# Patient Record
Sex: MALE | Race: WHITE | Employment: UNEMPLOYED | URBAN - METROPOLITAN AREA
[De-identification: names, ages, dates, MRNs, and addresses within clinical notes are randomized per-mention and may not be internally consistent; named-entity substitution may affect disease eponyms.]

---

## 2018-10-31 ENCOUNTER — OFFICE VISIT (OUTPATIENT)
Dept: PRIMARY CARE CLINIC | Age: 23
End: 2018-10-31
Payer: COMMERCIAL

## 2018-10-31 DIAGNOSIS — M54.6 ACUTE MIDLINE THORACIC BACK PAIN: ICD-10-CM

## 2018-10-31 DIAGNOSIS — M54.5 ACUTE BILATERAL LOW BACK PAIN, WITH SCIATICA PRESENCE UNSPECIFIED: ICD-10-CM

## 2018-10-31 DIAGNOSIS — Z13.31 POSITIVE DEPRESSION SCREENING: ICD-10-CM

## 2018-10-31 DIAGNOSIS — F32.1 CURRENT MODERATE EPISODE OF MAJOR DEPRESSIVE DISORDER WITHOUT PRIOR EPISODE (HCC): Primary | ICD-10-CM

## 2018-10-31 PROCEDURE — G8431 POS CLIN DEPRES SCRN F/U DOC: HCPCS | Performed by: NURSE PRACTITIONER

## 2018-10-31 PROCEDURE — 99203 OFFICE O/P NEW LOW 30 MIN: CPT | Performed by: NURSE PRACTITIONER

## 2018-10-31 PROCEDURE — G0444 DEPRESSION SCREEN ANNUAL: HCPCS | Performed by: NURSE PRACTITIONER

## 2018-10-31 RX ORDER — CYCLOBENZAPRINE HCL 10 MG
TABLET ORAL
Refills: 5 | COMMUNITY
Start: 2018-10-05 | End: 2019-05-15 | Stop reason: ALTCHOICE

## 2018-10-31 RX ORDER — IBUPROFEN 800 MG/1
TABLET ORAL
Refills: 5 | COMMUNITY
Start: 2018-10-05 | End: 2020-02-28

## 2018-10-31 RX ORDER — SERTRALINE HYDROCHLORIDE 25 MG/1
25 TABLET, FILM COATED ORAL DAILY
Qty: 30 TABLET | Refills: 0 | Status: SHIPPED | OUTPATIENT
Start: 2018-10-31 | End: 2018-11-15 | Stop reason: SINTOL

## 2018-10-31 ASSESSMENT — PATIENT HEALTH QUESTIONNAIRE - PHQ9
6. FEELING BAD ABOUT YOURSELF - OR THAT YOU ARE A FAILURE OR HAVE LET YOURSELF OR YOUR FAMILY DOWN: 1
7. TROUBLE CONCENTRATING ON THINGS, SUCH AS READING THE NEWSPAPER OR WATCHING TELEVISION: 0
SUM OF ALL RESPONSES TO PHQ9 QUESTIONS 1 & 2: 3
SUM OF ALL RESPONSES TO PHQ QUESTIONS 1-9: 10
2. FEELING DOWN, DEPRESSED OR HOPELESS: 1
10. IF YOU CHECKED OFF ANY PROBLEMS, HOW DIFFICULT HAVE THESE PROBLEMS MADE IT FOR YOU TO DO YOUR WORK, TAKE CARE OF THINGS AT HOME, OR GET ALONG WITH OTHER PEOPLE: 1
8. MOVING OR SPEAKING SO SLOWLY THAT OTHER PEOPLE COULD HAVE NOTICED. OR THE OPPOSITE, BEING SO FIGETY OR RESTLESS THAT YOU HAVE BEEN MOVING AROUND A LOT MORE THAN USUAL: 0
5. POOR APPETITE OR OVEREATING: 2
4. FEELING TIRED OR HAVING LITTLE ENERGY: 2
9. THOUGHTS THAT YOU WOULD BE BETTER OFF DEAD, OR OF HURTING YOURSELF: 0
1. LITTLE INTEREST OR PLEASURE IN DOING THINGS: 2
SUM OF ALL RESPONSES TO PHQ QUESTIONS 1-9: 10
3. TROUBLE FALLING OR STAYING ASLEEP: 2

## 2018-10-31 NOTE — PROGRESS NOTES
disorder 12/16/2011    TMJ disorder      Past Surgical History:   Procedure Laterality Date    TONSILLECTOMY AND ADENOIDECTOMY  80    TYMPANOPLASTY  89     Family History   Problem Relation Age of Onset    Depression Mother     Anxiety Disorder Maternal Grandmother      Social History     Social History    Marital status: Single     Spouse name: N/A    Number of children: N/A    Years of education: N/A     Occupational History    student      Social History Main Topics    Smoking status: Never Smoker    Smokeless tobacco: Never Used    Alcohol use No    Drug use: No    Sexual activity: Not Currently     Partners: Female     Other Topics Concern    Not on file     Social History Narrative    No narrative on file       Review of Systems   Constitutional: Positive for activity change and fatigue. Negative for appetite change, chills and fever. HENT: Negative. Eyes: Negative. Respiratory: Negative for cough, chest tightness, shortness of breath, wheezing and stridor. Cardiovascular: Negative for chest pain and palpitations. Gastrointestinal: Negative for abdominal pain, constipation, diarrhea, nausea and vomiting. Genitourinary: Negative for pelvic pain. Musculoskeletal: Positive for back pain. Negative for arthralgias, gait problem, joint swelling, myalgias, neck pain and neck stiffness. Skin: Negative for rash. Allergic/Immunologic: Negative for environmental allergies and food allergies. Neurological: Positive for numbness. Negative for syncope, weakness, light-headedness, headaches and paresthesias. Hematological: Negative for adenopathy. Does not bruise/bleed easily. Psychiatric/Behavioral: Positive for decreased concentration, dysphoric mood and sleep disturbance. Negative for agitation, confusion, self-injury and suicidal ideas. The patient is nervous/anxious. The patient is not hyperactive.         Vitals:  Vitals:    11/08/18 0908   BP: 122/74   Pulse: 106   Resp: 16

## 2018-11-08 VITALS
DIASTOLIC BLOOD PRESSURE: 74 MMHG | HEART RATE: 106 BPM | RESPIRATION RATE: 16 BRPM | OXYGEN SATURATION: 98 % | WEIGHT: 161 LBS | HEIGHT: 67 IN | SYSTOLIC BLOOD PRESSURE: 122 MMHG | TEMPERATURE: 97.8 F | BODY MASS INDEX: 25.27 KG/M2

## 2018-11-08 ASSESSMENT — ENCOUNTER SYMPTOMS
CONSTIPATION: 0
ABDOMINAL PAIN: 0
BACK PAIN: 1
DIARRHEA: 0
NAUSEA: 0
WHEEZING: 0
VOMITING: 0
CHEST TIGHTNESS: 0
EYES NEGATIVE: 1
COUGH: 0
STRIDOR: 0
SHORTNESS OF BREATH: 0

## 2018-11-12 ENCOUNTER — OFFICE VISIT (OUTPATIENT)
Dept: ORTHOPEDIC SURGERY | Age: 23
End: 2018-11-12
Payer: COMMERCIAL

## 2018-11-12 VITALS — WEIGHT: 161 LBS | BODY MASS INDEX: 25.27 KG/M2 | HEIGHT: 67 IN

## 2018-11-12 DIAGNOSIS — M51.36 DDD (DEGENERATIVE DISC DISEASE), LUMBAR: ICD-10-CM

## 2018-11-12 DIAGNOSIS — S39.012A LUMBAR STRAIN, INITIAL ENCOUNTER: ICD-10-CM

## 2018-11-12 DIAGNOSIS — M53.3 SI (SACROILIAC) JOINT DYSFUNCTION: Primary | ICD-10-CM

## 2018-11-12 DIAGNOSIS — M54.59 MECHANICAL LOW BACK PAIN: ICD-10-CM

## 2018-11-12 DIAGNOSIS — S29.019A STRAIN OF THORACIC REGION, INITIAL ENCOUNTER: ICD-10-CM

## 2018-11-12 PROCEDURE — 99243 OFF/OP CNSLTJ NEW/EST LOW 30: CPT | Performed by: INTERNAL MEDICINE

## 2018-11-12 NOTE — LETTER
[facility]  [address] [address]  Phone: [phone]  Fax: [phone]    Jairo Julio MD        November 12, 2018     Patient: Joseph Plascencia   YOB: 1995   Date of Visit: 11/12/2018       To Whom It May Concern: It is my medical opinion that Ade Carpio Can return to work with the following accommodations:     No running or jumping activity  Lifting limited to 50 pounds nonrepetitive with safe lifting techniques  Minimize repetitive bending or stooping  Alternate sitting and standing as needed for symptom control    These accommodations are in effect for the next 1 month. If you have any questions or concerns, please don't hesitate to call.     Sincerely,      Emma Beasley MD.    Jairo Julio MD

## 2018-11-12 NOTE — PROGRESS NOTES
Allergies: Allergies   Allergen Reactions    Penicillins         Social History:         Social History     Social History    Marital status: Single     Spouse name: N/A    Number of children: N/A    Years of education: N/A     Occupational History    student      Social History Main Topics    Smoking status: Never Smoker    Smokeless tobacco: Never Used    Alcohol use No    Drug use: No    Sexual activity: Not Currently     Partners: Female     Other Topics Concern    Not on file     Social History Narrative    No narrative on file        Review of Symptoms:    Pertinent items are noted in HPI    Review of systems reviewed from Patient History Form dated on Today's date and   available in the patient's chart under the Media tab. Vital Signs: There were no vitals filed for this visit. General Exam:     Constitutional: Patient is adequately groomed with no evidence of malnutrition  Mental Status: The patient is oriented to time, place and person. The patient's mood and affect are appropriate. Vascular: Examination reveals no swelling or calf tenderness. Peripheral pulses are palpable and 2+. Lymphatics: no lymphadenopathy of the inguinal region or lower extremity      Physical Exam: lower back/thoracic spine       Primary Exam:    Inspection:  No deformity atrophy or appreciable curvature       Palpation:  There is diffuse tenderness mid to lower lumbar axial and periaxial, no focal trigger point tenderness minimal tenderness axial thoracic spine      Range of Motion:  Thoracolumbar spine: 90/10 pain greater than extension than flexion      Strength:  Normal lower extremity      Special Tests:  Negative SLR, supine sit test positive dynamic lengthening from static shortening position-right lower extremity supine to sit      Skin: There are no rashes, ulcerations or lesions.       Gait: Nonantalgic      Reflex intact lower     Additional Comments:        Additional

## 2018-11-15 ENCOUNTER — OFFICE VISIT (OUTPATIENT)
Dept: PRIMARY CARE CLINIC | Age: 23
End: 2018-11-15
Payer: COMMERCIAL

## 2018-11-15 VITALS
HEART RATE: 113 BPM | DIASTOLIC BLOOD PRESSURE: 82 MMHG | OXYGEN SATURATION: 97 % | BODY MASS INDEX: 24.48 KG/M2 | WEIGHT: 156 LBS | HEIGHT: 67 IN | SYSTOLIC BLOOD PRESSURE: 122 MMHG

## 2018-11-15 DIAGNOSIS — F41.9 ANXIETY: ICD-10-CM

## 2018-11-15 DIAGNOSIS — F32.1 CURRENT MODERATE EPISODE OF MAJOR DEPRESSIVE DISORDER WITHOUT PRIOR EPISODE (HCC): Primary | ICD-10-CM

## 2018-11-15 PROCEDURE — G0444 DEPRESSION SCREEN ANNUAL: HCPCS | Performed by: NURSE PRACTITIONER

## 2018-11-15 PROCEDURE — 99213 OFFICE O/P EST LOW 20 MIN: CPT | Performed by: NURSE PRACTITIONER

## 2018-11-15 RX ORDER — CITALOPRAM 20 MG/1
20 TABLET ORAL DAILY
Qty: 30 TABLET | Refills: 0 | Status: SHIPPED | OUTPATIENT
Start: 2018-11-15 | End: 2019-05-15 | Stop reason: DRUGHIGH

## 2018-11-15 RX ORDER — HYDROXYZINE PAMOATE 50 MG/1
50 CAPSULE ORAL 3 TIMES DAILY PRN
Qty: 90 CAPSULE | Refills: 0 | Status: SHIPPED | OUTPATIENT
Start: 2018-11-15 | End: 2018-11-21 | Stop reason: SDUPTHER

## 2018-11-15 ASSESSMENT — PATIENT HEALTH QUESTIONNAIRE - PHQ9
2. FEELING DOWN, DEPRESSED OR HOPELESS: 2
7. TROUBLE CONCENTRATING ON THINGS, SUCH AS READING THE NEWSPAPER OR WATCHING TELEVISION: 1
9. THOUGHTS THAT YOU WOULD BE BETTER OFF DEAD, OR OF HURTING YOURSELF: 0
8. MOVING OR SPEAKING SO SLOWLY THAT OTHER PEOPLE COULD HAVE NOTICED. OR THE OPPOSITE, BEING SO FIGETY OR RESTLESS THAT YOU HAVE BEEN MOVING AROUND A LOT MORE THAN USUAL: 1
SUM OF ALL RESPONSES TO PHQ QUESTIONS 1-9: 15
10. IF YOU CHECKED OFF ANY PROBLEMS, HOW DIFFICULT HAVE THESE PROBLEMS MADE IT FOR YOU TO DO YOUR WORK, TAKE CARE OF THINGS AT HOME, OR GET ALONG WITH OTHER PEOPLE: 2
SUM OF ALL RESPONSES TO PHQ9 QUESTIONS 1 & 2: 4
1. LITTLE INTEREST OR PLEASURE IN DOING THINGS: 2
3. TROUBLE FALLING OR STAYING ASLEEP: 2
6. FEELING BAD ABOUT YOURSELF - OR THAT YOU ARE A FAILURE OR HAVE LET YOURSELF OR YOUR FAMILY DOWN: 3
4. FEELING TIRED OR HAVING LITTLE ENERGY: 3
5. POOR APPETITE OR OVEREATING: 1
SUM OF ALL RESPONSES TO PHQ QUESTIONS 1-9: 15

## 2018-11-21 ENCOUNTER — TELEPHONE (OUTPATIENT)
Dept: PRIMARY CARE CLINIC | Age: 23
End: 2018-11-21

## 2018-11-21 RX ORDER — HYDROXYZINE PAMOATE 50 MG/1
50 CAPSULE ORAL 3 TIMES DAILY PRN
Qty: 90 CAPSULE | Refills: 0 | Status: SHIPPED | OUTPATIENT
Start: 2018-11-21 | End: 2018-12-21

## 2018-11-21 ASSESSMENT — ENCOUNTER SYMPTOMS: RESPIRATORY NEGATIVE: 1

## 2018-11-21 NOTE — PROGRESS NOTES
Positive for activity change and fatigue. Negative for appetite change, chills, diaphoresis, fever and unexpected weight change. Respiratory: Negative. Cardiovascular: Negative. Neurological: Negative. Psychiatric/Behavioral: Positive for decreased concentration, dysphoric mood and sleep disturbance. Negative for agitation, behavioral problems, confusion, hallucinations, self-injury and suicidal ideas. The patient is nervous/anxious. The patient is not hyperactive. Vitals:  Vitals:    11/15/18 1440   BP: 122/82   Site: Left Upper Arm   Position: Sitting   Cuff Size: Large Adult   Pulse: 113   SpO2: 97%   Weight: 156 lb (70.8 kg)   Height: 5' 7\" (1.702 m)          Physical Exam   Constitutional: He is oriented to person, place, and time. He appears well-developed and well-nourished. No distress. Neck: Normal range of motion. Cardiovascular: Normal rate, regular rhythm, normal heart sounds and intact distal pulses. No murmur heard. Pulmonary/Chest: Effort normal and breath sounds normal. No respiratory distress. He has no wheezes. Neurological: He is alert and oriented to person, place, and time. Coordination normal.   Skin: Skin is warm and dry. Capillary refill takes less than 2 seconds. No rash noted. He is not diaphoretic. Psychiatric: His speech is normal. Judgment and thought content normal. His mood appears anxious. He is withdrawn. Cognition and memory are normal. He exhibits a depressed mood (tearful during visit). He expresses no homicidal and no suicidal ideation. He expresses no suicidal plans and no homicidal plans. He is attentive. Vitals reviewed. Assessment/Plan:   Yisel Romero was seen today for medication adjustment. Diagnoses and all orders for this visit:    Current moderate episode of major depressive disorder without prior episode (HCC)  -     citalopram (CELEXA) 20 MG tablet;  Take 1 tablet by mouth daily    Anxiety  -     hydrOXYzine (VISTARIL) 50 MG capsule;

## 2018-11-28 ENCOUNTER — OFFICE VISIT (OUTPATIENT)
Dept: PRIMARY CARE CLINIC | Age: 23
End: 2018-11-28
Payer: COMMERCIAL

## 2018-11-28 VITALS
OXYGEN SATURATION: 99 % | WEIGHT: 157 LBS | DIASTOLIC BLOOD PRESSURE: 82 MMHG | HEART RATE: 90 BPM | SYSTOLIC BLOOD PRESSURE: 118 MMHG | BODY MASS INDEX: 24.59 KG/M2

## 2018-11-28 DIAGNOSIS — F32.1 CURRENT MODERATE EPISODE OF MAJOR DEPRESSIVE DISORDER WITHOUT PRIOR EPISODE (HCC): Primary | ICD-10-CM

## 2018-11-28 DIAGNOSIS — F41.9 ANXIETY: ICD-10-CM

## 2018-11-28 PROCEDURE — 99213 OFFICE O/P EST LOW 20 MIN: CPT | Performed by: NURSE PRACTITIONER

## 2018-11-28 PROCEDURE — G0444 DEPRESSION SCREEN ANNUAL: HCPCS | Performed by: NURSE PRACTITIONER

## 2018-11-28 RX ORDER — CITALOPRAM 10 MG/1
10 TABLET ORAL DAILY
Qty: 30 TABLET | Refills: 0 | Status: SHIPPED | OUTPATIENT
Start: 2018-11-28 | End: 2019-05-15 | Stop reason: DRUGHIGH

## 2018-11-28 ASSESSMENT — PATIENT HEALTH QUESTIONNAIRE - PHQ9
9. THOUGHTS THAT YOU WOULD BE BETTER OFF DEAD, OR OF HURTING YOURSELF: 0
5. POOR APPETITE OR OVEREATING: 2
7. TROUBLE CONCENTRATING ON THINGS, SUCH AS READING THE NEWSPAPER OR WATCHING TELEVISION: 1
10. IF YOU CHECKED OFF ANY PROBLEMS, HOW DIFFICULT HAVE THESE PROBLEMS MADE IT FOR YOU TO DO YOUR WORK, TAKE CARE OF THINGS AT HOME, OR GET ALONG WITH OTHER PEOPLE: 1
8. MOVING OR SPEAKING SO SLOWLY THAT OTHER PEOPLE COULD HAVE NOTICED. OR THE OPPOSITE, BEING SO FIGETY OR RESTLESS THAT YOU HAVE BEEN MOVING AROUND A LOT MORE THAN USUAL: 0
1. LITTLE INTEREST OR PLEASURE IN DOING THINGS: 3
SUM OF ALL RESPONSES TO PHQ QUESTIONS 1-9: 10
3. TROUBLE FALLING OR STAYING ASLEEP: 1
SUM OF ALL RESPONSES TO PHQ9 QUESTIONS 1 & 2: 4
2. FEELING DOWN, DEPRESSED OR HOPELESS: 1
6. FEELING BAD ABOUT YOURSELF - OR THAT YOU ARE A FAILURE OR HAVE LET YOURSELF OR YOUR FAMILY DOWN: 1
SUM OF ALL RESPONSES TO PHQ QUESTIONS 1-9: 10
4. FEELING TIRED OR HAVING LITTLE ENERGY: 1

## 2018-11-28 ASSESSMENT — ENCOUNTER SYMPTOMS
RESPIRATORY NEGATIVE: 1
GASTROINTESTINAL NEGATIVE: 1

## 2018-11-28 NOTE — PROGRESS NOTES
Large Adult   Pulse: 90   SpO2: 99%   Weight: 157 lb (71.2 kg)          Physical Exam   Constitutional: He is oriented to person, place, and time. He appears well-developed and well-nourished. No distress. Neck: Normal range of motion. No thyromegaly present. Cardiovascular: Normal rate, regular rhythm, normal heart sounds and intact distal pulses. Exam reveals no friction rub. No murmur heard. Pulmonary/Chest: Effort normal and breath sounds normal.   Neurological: He is alert and oriented to person, place, and time. Coordination normal.   Skin: Skin is warm and dry. Capillary refill takes less than 2 seconds. No rash noted. He is not diaphoretic. Psychiatric: His speech is normal and behavior is normal. Judgment and thought content normal. His mood appears anxious (some improvement). Cognition and memory are normal. He exhibits a depressed mood (mild but improved). He is attentive. Vitals reviewed. Assessment/Plan:   Gia Gomez was seen today for medication check. Diagnoses and all orders for this visit:    Current moderate episode of major depressive disorder without prior episode ([facility] [address]  -     citalopram (CELEXA) 10 MG tablet; Take 1 tablet by mouth daily    Anxiety  Continue vistaril for anxiety and improved sleep at night. Patient aware he can take this 3 times daily but wishes to keep just at night for the time being. He also reports he has not scheduled with counseling but plans to do that soon. Return in about 3 weeks (around 12/19/2018). Patient agrees to plan and expresses understanding.     [address], APRN - NP

## 2019-03-05 ENCOUNTER — TELEPHONE (OUTPATIENT)
Dept: ORTHOPEDIC SURGERY | Age: 24
End: 2019-03-05

## 2019-05-15 ENCOUNTER — OFFICE VISIT (OUTPATIENT)
Dept: PRIMARY CARE CLINIC | Age: 24
End: 2019-05-15
Payer: COMMERCIAL

## 2019-05-15 VITALS
WEIGHT: 175.4 LBS | OXYGEN SATURATION: 97 % | DIASTOLIC BLOOD PRESSURE: 68 MMHG | SYSTOLIC BLOOD PRESSURE: 98 MMHG | BODY MASS INDEX: 27.47 KG/M2 | HEART RATE: 102 BPM

## 2019-05-15 DIAGNOSIS — M54.6 ACUTE MIDLINE THORACIC BACK PAIN: Primary | ICD-10-CM

## 2019-05-15 DIAGNOSIS — F32.1 CURRENT MODERATE EPISODE OF MAJOR DEPRESSIVE DISORDER WITHOUT PRIOR EPISODE (HCC): ICD-10-CM

## 2019-05-15 DIAGNOSIS — F41.9 ANXIETY: ICD-10-CM

## 2019-05-15 PROCEDURE — 99213 OFFICE O/P EST LOW 20 MIN: CPT | Performed by: NURSE PRACTITIONER

## 2019-05-15 PROCEDURE — G0444 DEPRESSION SCREEN ANNUAL: HCPCS | Performed by: NURSE PRACTITIONER

## 2019-05-15 RX ORDER — CITALOPRAM 20 MG/1
20 TABLET ORAL DAILY
Qty: 30 TABLET | Refills: 0 | Status: SHIPPED | OUTPATIENT
Start: 2019-05-15 | End: 2020-02-28

## 2019-05-15 ASSESSMENT — PATIENT HEALTH QUESTIONNAIRE - PHQ9
5. POOR APPETITE OR OVEREATING: 2
10. IF YOU CHECKED OFF ANY PROBLEMS, HOW DIFFICULT HAVE THESE PROBLEMS MADE IT FOR YOU TO DO YOUR WORK, TAKE CARE OF THINGS AT HOME, OR GET ALONG WITH OTHER PEOPLE: 1
SUM OF ALL RESPONSES TO PHQ QUESTIONS 1-9: 9
8. MOVING OR SPEAKING SO SLOWLY THAT OTHER PEOPLE COULD HAVE NOTICED. OR THE OPPOSITE, BEING SO FIGETY OR RESTLESS THAT YOU HAVE BEEN MOVING AROUND A LOT MORE THAN USUAL: 0
SUM OF ALL RESPONSES TO PHQ9 QUESTIONS 1 & 2: 2
SUM OF ALL RESPONSES TO PHQ QUESTIONS 1-9: 9
6. FEELING BAD ABOUT YOURSELF - OR THAT YOU ARE A FAILURE OR HAVE LET YOURSELF OR YOUR FAMILY DOWN: 1
3. TROUBLE FALLING OR STAYING ASLEEP: 2
9. THOUGHTS THAT YOU WOULD BE BETTER OFF DEAD, OR OF HURTING YOURSELF: 0
7. TROUBLE CONCENTRATING ON THINGS, SUCH AS READING THE NEWSPAPER OR WATCHING TELEVISION: 0
1. LITTLE INTEREST OR PLEASURE IN DOING THINGS: 1
4. FEELING TIRED OR HAVING LITTLE ENERGY: 2
2. FEELING DOWN, DEPRESSED OR HOPELESS: 1

## 2019-05-15 NOTE — LETTER
[facility]  [address] [facility] [address]  Phone: [phone] Goldman, GENEVIEVE Green NP        May 15, 2019     Patient: Gerhard Bravo   YOB: 1995   Date of Visit: 5/15/2019       To Whom it May Concern:    Marciano Almaraz was seen in my clinic on 5/15/2019. He may return to school on 5/15/2019. He may return to school on 5/16/2019. He has been diagnosed with Acute Thoracic Back Pain today, he has been referred to physical therapy for treatment at this time and will be following up as need. He should refrain from any heavy lifting (over 30 lbs) until he has been cleared by physical therapy to do so. If you have any questions or concerns, please don't hesitate to call.     Sincerely,         GENEVIEVE Kapadia - NP

## 2019-05-15 NOTE — LETTER
[facility]  [address] [facility] [address]  Phone: [phone] Dandre Goldman, GENEVIEVE Green NP        May 15, 2019     Patient: Kanchan Charles   YOB: 1995   Date of Visit: 5/15/2019       To Whom it May Concern:    Dilip Najera was seen in my clinic on 5/15/2019. He may return to school on 5/16/2019. He has been diagnosed with Acute Thoracic Back Pain today, he has been referred to physical therapy for treatment at this time and will be following up as need. If you have any questions or concerns, please don't hesitate to call.     Sincerely,         GENEVIEVE Pickett NP

## 2019-05-15 NOTE — PROGRESS NOTES
Patient: Yoana Jones is a 24 y.o. male who presents today with the following Chief Complaint(s):   Chief Complaint   Patient presents with    Medication Check     would like to discuss medication for depression/anxiety. Pt was on meds before but ran out and didn't have a follow up.  Lower Back Pain     Middle lower back pain x 3 years. No injury       Depression/Anxiety follow-up: Patient here for follow-up of depression and anxiety. He reports worsening symptoms of depressed mood, fatigue, feelings of worthlessness/guilt and hopelessness. Anxiety is increased. He reports being off of his Celexa for several months. Onset was approximately several years ago, gradually worsening since that time until beginning treatment here. He denies current suicidal and homicidal plan or intent. Family history significant for anxiety and depression. Possible organic causes contributing are: none. Risk factors: positive family history in  mother and grandmother and previous episode of social anxiety. Previous treatment includes Zoloft with no benefit. He was started on Celexa with improvement of symptoms but stopped taking medications after needing a refill and not making an appointment for follow-up. Patient has also been off of his vistaril that was prescribed for anxiety symptoms and sleep. Back Pain   This is a recurrent problem. The current episode started more than 1 month ago (about 9 months ago, suddenly, denies injury). The problem occurs constantly. The problem has been gradually worsening since onset. The pain is present in the lumbar spine. The quality of the pain is described as aching, shooting and stabbing. The pain radiates to the left thigh and right thigh. The pain is at a severity of 6/10. The pain is the same all the time. The symptoms are aggravated by bending, position, sitting, standing, lying down and twisting. Stiffness is present all day.  Associated symptoms include leg pain and distress. Neck: Trachea normal, normal range of motion and full passive range of motion without pain. Neck supple. No thyromegaly present. Cardiovascular: Normal rate, regular rhythm, S1 normal, S2 normal, normal heart sounds and intact distal pulses. Exam reveals no gallop and no friction rub. No murmur heard. Pulmonary/Chest: Effort normal and breath sounds normal. No respiratory distress. He has no decreased breath sounds. He has no wheezes. He has no rhonchi. Abdominal: Soft. Bowel sounds are normal. He exhibits no distension and no mass. There is no tenderness. There is no rebound and no guarding. No hernia. Musculoskeletal: He exhibits tenderness. He exhibits no edema or deformity. Thoracic back: He exhibits decreased range of motion, tenderness and pain. He exhibits no bony tenderness, no swelling, no edema, no deformity, no laceration and no spasm. Lumbar back: He exhibits decreased range of motion, tenderness and pain. He exhibits no bony tenderness, no swelling, no edema, no deformity, no laceration, no spasm and normal pulse. Right upper leg: He exhibits no tenderness, no bony tenderness, no swelling, no edema and no deformity. Left upper leg: He exhibits no tenderness, no bony tenderness, no swelling, no edema and no deformity. Lymphadenopathy:     He has no cervical adenopathy. He has no axillary adenopathy. No inguinal adenopathy noted on the right or left side. Neurological: He is alert and oriented to person, place, and time. He has normal strength and normal reflexes. He displays no atrophy and no tremor. No cranial nerve deficit or sensory deficit. He exhibits normal muscle tone. Coordination and gait normal.   Skin: Skin is warm. Capillary refill takes less than 2 seconds. No rash noted. He is not diaphoretic. Psychiatric: He has a normal mood and affect. His behavior is normal.   Vitals reviewed.       Assessment/Plan:   Zonia Francisco was seen today for medication check and lower back pain. Diagnoses and all orders for this visit:    Acute midline thoracic back pain  -     [facility]    Current moderate episode of major depressive disorder without prior episode (HCC)  -     citalopram (CELEXA) 20 MG tablet; Take 1 tablet by mouth daily    Anxiety  -     citalopram (CELEXA) 20 MG tablet; Take 1 tablet by mouth daily      Return in about 1 month (around 6/12/2019). Patient agrees to plan and expresses understanding.     P[address], APRN - NP

## 2019-05-20 ENCOUNTER — HOSPITAL ENCOUNTER (OUTPATIENT)
Dept: PHYSICAL THERAPY | Age: 24
Setting detail: THERAPIES SERIES
Discharge: HOME | End: 2019-05-20
Payer: COMMERCIAL

## 2019-05-20 PROCEDURE — 97161 PT EVAL LOW COMPLEX 20 MIN: CPT | Performed by: PHYSICAL THERAPIST

## 2019-05-20 PROCEDURE — 97110 THERAPEUTIC EXERCISES: CPT | Performed by: PHYSICAL THERAPIST

## 2019-05-20 NOTE — FLOWSHEET NOTE
flexibility, endurance, ROM  for improvements in proximal hip and core control with self care, mobility, lifting and ambulation.  [] (43507) Provided verbal/tactile cueing for activities related to improving balance, coordination, kinesthetic sense, posture, motor skill, proprioception  to assist with core control in self care, mobility, lifting, and ambulation. Therapeutic Activities:    [] (60323 or 67789) Provided verbal/tactile cueing for activities related to improving balance, coordination, kinesthetic sense, posture, motor skill, proprioception and motor activation to allow for proper function  with self care and ADLs  [] (17485) Provided training and instruction to the patient for proper core and proximal hip recruitment and positioning with ambulation re-education     Home Exercise Program:    [x] (52036) Reviewed/Progressed HEP activities related to strengthening, flexibility, endurance, ROM of core, proximal hip and LE for functional self-care, mobility, lifting and ambulation   [] (59175) Reviewed/Progressed HEP activities related to improving balance, coordination, kinesthetic sense, posture, motor skill, proprioception of core, proximal hip and LE for self care, mobility, lifting, and ambulation      Manual Treatments:  PROM / STM / Oscillations-Mobs:  G-I, II, III, IV (PA's, Inf., Post.)  [] (73091) Provided manual therapy to mobilize proximal hip and LS spine soft tissue/joints for the purpose of modulating pain, promoting relaxation,  increasing ROM, reducing/eliminating soft tissue swelling/inflammation/restriction, improving soft tissue extensibility and allowing for proper ROM for normal function with self care, mobility, lifting and ambulation.      Modalities:   Ice 10'     Charges:  Timed Code Treatment Minutes: 25   Total Treatment Minutes: 60       [x] EVAL (LOW) 46921 (typically 20 minutes face-to-face)  [] EVAL (MOD) 45871 (typically 30 minutes face-to-face)  [] EVAL (HIGH) 08095 educated on clinic layout and use of modalities. Prognosis: [x] Good [] Fair  [] Poor    Patient Requires Follow-up: [x] Yes  [] No    PLAN: See eval  [] Continue per plan of care [] Alter current plan (see comments)  [x] Plan of care initiated [] Hold pending MD visit [] Discharge    Electronically signed by: Rocío Coello PT, DPT    *If patient does not return for further follow ups after this date. Please consider this as the patients discharge from physical therapy.

## 2019-05-20 NOTE — PLAN OF CARE
forward     Type: [x]Constant           []Intermittent      []Radiating         []Localized         []other:                Numbness/Tingling: denies any neurological symptoms      Occupation/School: student - computer programming; [employer]      Living Status/Prior Level of Function: Independent with ADLs and IADLs      OBJECTIVE:   ROM   Comments   Trunk flexion 25% limited  Pain both directions   Trunk extension WNL  Pain with wincing    Trunk R sidebend Min restriction  Pain    Trunk L sidebend Min restriction  Pain    Trunk R rotation Mod restriction Pain with wincing    Trunk L rotation Mod restriction  Pain    HS flexibility R 50 deg   L 45 deg  Passive SL R    Hip IR  Mod limited      ITB  Mod restriction with pain On R         Strength Left Right Comments   Hip flexion(L2) 4 pain  4 pain     Knee extension(L3) 5 5    Knee flexion(S1-2) 5 5    Ankle dorsiflexion(L4) 5 5    Toe extension(L5) NT NT     Ankle eversion/plantar flexion(S1) 5 5    Hip abd   4 4 pain         Special tests   Comments   SLR -     Slump test -     Pelvic symmetry  Level in sitting      Segmental Spinal mobility Hypo - thoracic and lumbar with pain present and increased tightness in muscles      Heel walk WNL      Toe walk WNL      Sacral provocation  Positive      Megan's  Negative     Prone on elbows  Alleviates symptoms        DTRs Left Right Comments   Patellar(L3-L4) Normal  Normal       Achilles(S1-S2) Normal   Normal                   Joint mobility:               []Normal               [x]Hypo              []Hyper     Palpation: TTP along paraspinals bilaterally in lumbar and thoracic area      Functional Mobility/Transfers: WNL      Posture: slouched posture      Bandages/Dressings/Incisions: n/a      Gait: (include devices/WB status) WFL - early heel rise                           [x] Patient history, allergies, meds reviewed. Medical chart reviewed. See intake form.       Review Of Systems (ROS):  [x]Performed Review and/or generalized hypermobility              [x]Decreased Lumbosacral/hip/LE functional ROM              [x]Decreased core/proximal hip strength and neuromuscular control               [x]Decreased LE functional strength               []Abnormal reflexes/sensation/myotomal/dermatomal deficits  [x]Reduced balance/proprioceptive control               []other:       Functional Activity Limitations (from functional questionnaire and intake)              [x]Reduced ability to tolerate prolonged functional positions              [x]Reduced ability or difficulty with changes of positions or transfers between positions              [x]Reduced ability to maintain good posture and demonstrate good body mechanics with sitting, bending, and lifting              [x]Reduced ability to sleep              [] Reduced ability or tolerance with driving and/or computer work              [x]Reduced ability to perform lifting, reaching, carrying tasks              []Reduced ability to squat              [x]Reduced ability to forward bend              [x]Reduced ability to ambulate prolonged functional periods/distances/surfaces              []Reduced ability to ascend/descend stairs              []other:       Participation Restrictions              []Reduced participation in self care activities              [x]Reduced participation in home management activities              []Reduced participation in work activities              [x]Reduced participation in social activities. []Reduced participation in sport/recreational activities. Classification:              []Signs/symptoms consistent with Lumbar instability/stabilization subgroup. [x]Signs/symptoms consistent with Lumbar mobilization/manipulation subgroup, myotomes and dermatomes intact. Meets manipulation criteria.                []Signs/symptoms consistent with Lumbar direction specific/centralization subgroup              []Signs/symptoms consistent with Lumbar traction subgroup                            []Signs/symptoms consistent with lumbar facet dysfunction              []Signs/symptoms consistent with lumbar stenosis type dysfunction              []Signs/symptoms consistent with nerve root involvement including myotome & dermatome dysfunction              []Signs/symptoms consistent with post-surgical status including: decreased ROM, strength and function. []signs/symptoms consistent with pathology which may benefit from Dry needling                [x]other: Signs/symptoms consistent with postural dysfunction        Prognosis/Rehab Potential:                                       []Excellent              [x]Good                 []Fair              []Poor     Tolerance of evaluation/treatment:               []Excellent              [x]Good                 []Fair              []Poor     Physical Therapy Evaluation Complexity Justification  [x] A history of present problem with:  [x] no personal factors and/or comorbidities that impact the plan of care;  []1-2 personal factors and/or comorbidities that impact the plan of care  []3 personal factors and/or comorbidities that impact the plan of care  [x] An examination of body systems using standardized tests and measures addressing any of the following: body structures and functions (impairments), activity limitations, and/or participation restrictions;:  [] a total of 1-2 or more elements   [] a total of 3 or more elements   [x] a total of 4 or more elements   [x] A clinical presentation with:  [x] stable and/or uncomplicated characteristics   [] evolving clinical presentation with changing characteristics  [] unstable and unpredictable characteristics;   [x] Clinical decision making of [] low, [] moderate, [] high complexity using standardized patient assessment instrument and/or measurable assessment of functional outcome.      [x] EVAL (LOW) 35769 (typically 20 minutes face-to-face)  [] EVAL (MOD) 31106 (typically 30 minutes face-to-face)  [] EVAL (HIGH) 81122 (typically 45 minutes face-to-face)  [] RE-EVAL            PLAN:      Frequency/Duration:  2 days per week for 4 Weeks:  Interventions:  [x]  Therapeutic exercise including: strength training, ROM, for LE, Glutes and core   [x]  NMR activation and proprioception for glutes , LE and Core   [x]  Manual therapy as indicated for Hip complex, LE and spine to include: Dry Needling/IASTM, STM, PROM, Gr I-IV mobilizations, manipulation. [x]  Modalities as needed that may include: thermal agents, E-stim, Biofeedback, US, iontophoresis as indicated  [x]  Patient education on joint protection, postural re-education, activity modification, progression of HEP. HEP instruction: (see scanned forms)     GOALS:  Patient stated goal: Get rid of pain      Therapist goals for Patient:   Short Term Goals: To be achieved in: 2 weeks  1. Independent in HEP and progression per patient tolerance, in order to prevent re-injury. 2. Patient will have a decrease in pain to facilitate improvement in movement, function, and ADLs as indicated by Functional Deficits. Long Term Goals: To be achieved in: 6-8 weeks  1. Disability index score of 5% or less for the [place] to assist with reaching prior level of function. 2. Patient will demonstrate increased AROM to WNL, good LS mobility, good hip ROM to allow for proper joint functioning as indicated by patients Functional Deficits. 3. Patient will demonstrate an increase in Strength to good proximal hip and core activation to allow for proper functional mobility as indicated by patients Functional Deficits. 4. Patient will return to daily functional activities without increased symptoms or restriction.

## 2019-05-21 ASSESSMENT — ENCOUNTER SYMPTOMS
ABDOMINAL PAIN: 0
NAUSEA: 0
CONSTIPATION: 0
BACK PAIN: 1
STRIDOR: 0
SHORTNESS OF BREATH: 0
EYES NEGATIVE: 1
VOMITING: 0
DIARRHEA: 0
CHEST TIGHTNESS: 0
COUGH: 0
COLOR CHANGE: 0
WHEEZING: 0

## 2019-05-23 ENCOUNTER — HOSPITAL ENCOUNTER (OUTPATIENT)
Dept: PHYSICAL THERAPY | Age: 24
Setting detail: THERAPIES SERIES
Discharge: HOME | End: 2019-05-23
Payer: COMMERCIAL

## 2019-05-23 PROCEDURE — 97140 MANUAL THERAPY 1/> REGIONS: CPT | Performed by: PHYSICAL THERAPIST

## 2019-05-23 PROCEDURE — 97110 THERAPEUTIC EXERCISES: CPT | Performed by: PHYSICAL THERAPIST

## 2019-05-23 NOTE — FLOWSHEET NOTE
Prone PA 6'     IASTM      Lumbar roll  6'  Performed bilaterally    SI Manip      Hip belt mobs      Hip LA distraction              Therapeutic Exercise and NMR EXR  [x] (26713) Provided verbal/tactile cueing for activities related to strengthening, flexibility, endurance, ROM  for improvements in proximal hip and core control with self care, mobility, lifting and ambulation.  [] (51105) Provided verbal/tactile cueing for activities related to improving balance, coordination, kinesthetic sense, posture, motor skill, proprioception  to assist with core control in self care, mobility, lifting, and ambulation.      Therapeutic Activities:    [] (13961 or 75217) Provided verbal/tactile cueing for activities related to improving balance, coordination, kinesthetic sense, posture, motor skill, proprioception and motor activation to allow for proper function  with self care and ADLs  [] (79321) Provided training and instruction to the patient for proper core and proximal hip recruitment and positioning with ambulation re-education     Home Exercise Program:    [x] (15795) Reviewed/Progressed HEP activities related to strengthening, flexibility, endurance, ROM of core, proximal hip and LE for functional self-care, mobility, lifting and ambulation   [] (15524) Reviewed/Progressed HEP activities related to improving balance, coordination, kinesthetic sense, posture, motor skill, proprioception of core, proximal hip and LE for self care, mobility, lifting, and ambulation      Manual Treatments:  PROM / STM / Oscillations-Mobs:  G-I, II, III, IV (PA's, Inf., Post.)  [] (62779) Provided manual therapy to mobilize proximal hip and LS spine soft tissue/joints for the purpose of modulating pain, promoting relaxation,  increasing ROM, reducing/eliminating soft tissue swelling/inflammation/restriction, improving soft tissue extensibility and allowing for proper ROM for normal function with self care, mobility, lifting and ambulation. Modalities:   Ice 10'     Charges:  Timed Code Treatment Minutes: 40   Total Treatment Minutes: [phone]       [] EVAL (LOW) 34394 (typically 20 minutes face-to-face)  [] EVAL (MOD) 59914 (typically 30 minutes face-to-face)  [] EVAL (HIGH) 22595 (typically 45 minutes face-to-face)  [] RE-EVAL     [x] OV(87501) x  2   [] IONTO  [] NMR (62771) x      [] VASO  [x] Manual (42257) x  1    [] Other:  [] TA x       [] Mech Traction (59168)  [] ES(attended) (70569)      [] ES (un) (68248):     Goals:   Patient stated goal: Get rid of pain      Therapist goals for Patient:   Short Term Goals: To be achieved in: 2 weeks  1. Independent in HEP and progression per patient tolerance, in order to prevent re-injury. 2. Patient will have a decrease in pain to facilitate improvement in movement, function, and ADLs as indicated by Functional Deficits. Long Term Goals: To be achieved in: 6-8 weeks  1. Disability index score of 5% or less for the [place] to assist with reaching prior level of function. 2. Patient will demonstrate increased AROM to WNL, good LS mobility, good hip ROM to allow for proper joint functioning as indicated by patients Functional Deficits. 3. Patient will demonstrate an increase in Strength to good proximal hip and core activation to allow for proper functional mobility as indicated by patients Functional Deficits. 4. Patient will return to daily functional activities without increased symptoms or restriction. Progression Towards Functional goals:  [] Patient is progressing as expected towards functional goals listed. [] Progression is slowed due to complexities listed. [] Progression has been slowed due to co-morbidities.   [x] Plan just implemented, too soon to assess goals progression  [] Other:     ASSESSMENT:  See eval    Treatment/Activity Tolerance:  [x] Patient tolerated treatment well [] Patient limited by fatique  [] Patient limited by pain  [] Patient limited by other medical complications  [x] Other: Reviewed technique of all exercises with pt. He did note some pain today with the intro of cat/camel therefore did not continue with them. Some pain noted with manual treatment - a lot of muscle guarding present while attempting to perform lumbar roll. Consider IASTM to help with relaxation. 5/23    Patient education:  5/20 Patient education on PT and plan of care including diagnosis, prognosis, treatment goals and options. Patient agrees with discussed POC and treatment and is aware of rehab process. Pt was also educated on clinic layout and use of modalities. Prognosis: [x] Good [] Fair  [] Poor    Patient Requires Follow-up: [x] Yes  [] No    PLAN: See eval  [x] Continue per plan of care [] Alter current plan (see comments)  [] Plan of care initiated [] Hold pending MD visit [] Discharge    Electronically signed by: Emma Casey PT, DPT    *If patient does not return for further follow ups after this date. Please consider this as the patients discharge from physical therapy.

## 2019-07-24 ENCOUNTER — TELEPHONE (OUTPATIENT)
Dept: ORTHOPEDIC SURGERY | Age: 24
End: 2019-07-24

## 2019-07-24 NOTE — TELEPHONE ENCOUNTER
FAXED A NO RECORDS STATEMENT FOR MEDICAL RECORDS FOR [address] [place] @ [phone]

## 2019-08-07 ENCOUNTER — TELEPHONE (OUTPATIENT)
Dept: PRIMARY CARE CLINIC | Age: 24
End: 2019-08-07

## 2020-02-27 NOTE — PROGRESS NOTES
Marcia Rubalcava   21 y.o. male   1995    Chief Complaint   Patient presents with    New Patient     reports some depression (Hx of treatment with Celexa and reports severe decrease in appetite), back pain, and difficulty swallowing      HPI:    This is patient's first visit with me. 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 He is in his last year. Anxiety/Depression/Bipolar/Other:   -Patient complains of symptoms: lack motivation, sadness all the time.   -Panic attacks? no  -Mood swings? no  -Psychotic features? no  -Onset: 3-4 years  -Clinical course: He is involved in the [employer]. He stated that he wants to look for work, but doesn't have the drive to do so. He is not forced by his parent to look for work. He lives with his mom. He is not in contact with his dad. Patient stated that he use to feel depressed only for a day, but now it's lasting weeks at a time.   -Sleep: difficulty falling asleep - takes a \"couple of hours\"; sleeps 7-8 hours  -Appetite: bad  -Interactions with friends & family: has friends  -Performance at work? None reported  -Performance at school? 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  -Family history significant for anxiety and depression  -Previous treatment includes Sertraline, Citalopram (Celexa) - started on winter 2018. Side effects of meds tried: Citalopram suppressed his appetite and dry mouth. Stopped it after a month due to side effects and did not notice a positive change. Was on Sertraline for 2 months.  -Seen a mental health provider before? no  -Have you been to ER or admitted to inpatient psychiatric unit? no  -Illicit drug use: no  -Suicidal ideation: No  -Homicidal ideation: No     Patient also reports of dysphagia, which has progressively worsened.   Has been going on for the past years. Happens with anything like spaghetti, salad, steak, etc.  He feels like things get stuck in his throat. He has to at times has to stick his fingers down his throat to get himself to vomit. He is gaining weight rather than losing weight. He has gained 20-30 lbs over past 12 months. He denied heartburn or regurgitation. Has no issues with dyspnea or wheezing. Denied hoarseness or esophageal spasm/tightening. Allergies   Allergen Reactions    Penicillins      No current outpatient medications on file prior to visit. No current facility-administered medications on file prior to visit. Family History   Problem Relation Age of Onset    Depression Mother     Anxiety Disorder Maternal Grandmother      Social History     Tobacco Use    Smoking status: Never Smoker    Smokeless tobacco: Never Used   Substance Use Topics    Alcohol use: No      Review of Systems   Constitutional: Negative for activity change, appetite change, fatigue, fever and unexpected weight change. HENT: Negative for congestion, rhinorrhea, sinus pressure and trouble swallowing. Respiratory: Negative for cough, chest tightness, shortness of breath and wheezing. Cardiovascular: Negative for chest pain, palpitations and leg swelling. Gastrointestinal: Negative for abdominal distention, abdominal pain, blood in stool, constipation, diarrhea, nausea and vomiting. Genitourinary: Negative for dysuria, frequency and hematuria. Musculoskeletal: Negative for arthralgias and back pain. Skin: Negative for rash. Neurological: Negative for dizziness, weakness, light-headedness, numbness and headaches. Psychiatric/Behavioral: Negative for self-injury, sleep disturbance and suicidal ideas. The patient is nervous/anxious.       Wt Readings from Last 3 Encounters:   02/28/20 189 lb (85.7 kg)   05/15/19 175 lb 6.4 oz (79.6 kg)   11/28/18 157 lb (71.2 kg)     BP Readings from Last 3 Encounters:

## 2020-02-28 ENCOUNTER — OFFICE VISIT (OUTPATIENT)
Dept: FAMILY MEDICINE CLINIC | Age: 25
End: 2020-02-28
Payer: COMMERCIAL

## 2020-02-28 VITALS
HEIGHT: 67 IN | DIASTOLIC BLOOD PRESSURE: 80 MMHG | OXYGEN SATURATION: 97 % | RESPIRATION RATE: 14 BRPM | HEART RATE: 92 BPM | BODY MASS INDEX: 29.66 KG/M2 | WEIGHT: 189 LBS | TEMPERATURE: 98 F | SYSTOLIC BLOOD PRESSURE: 110 MMHG

## 2020-02-28 PROBLEM — F33.1 MODERATE EPISODE OF RECURRENT MAJOR DEPRESSIVE DISORDER (HCC): Status: ACTIVE | Noted: 2020-02-28

## 2020-02-28 PROBLEM — G89.29 CHRONIC LEFT-SIDED LOW BACK PAIN WITHOUT SCIATICA: Status: ACTIVE | Noted: 2020-02-28

## 2020-02-28 PROBLEM — M54.50 CHRONIC LEFT-SIDED LOW BACK PAIN WITHOUT SCIATICA: Status: ACTIVE | Noted: 2020-02-28

## 2020-02-28 PROBLEM — R13.10 DYSPHAGIA: Status: ACTIVE | Noted: 2020-02-28

## 2020-02-28 PROCEDURE — 99204 OFFICE O/P NEW MOD 45 MIN: CPT | Performed by: FAMILY MEDICINE

## 2020-02-28 RX ORDER — CELECOXIB 200 MG/1
200 CAPSULE ORAL EVERY 12 HOURS PRN
Qty: 60 CAPSULE | Refills: 0 | Status: SHIPPED | OUTPATIENT
Start: 2020-02-28 | End: 2020-03-30 | Stop reason: SDUPTHER

## 2020-02-28 RX ORDER — VENLAFAXINE HYDROCHLORIDE 37.5 MG/1
37.5 CAPSULE, EXTENDED RELEASE ORAL DAILY
Qty: 30 CAPSULE | Refills: 1 | Status: SHIPPED | OUTPATIENT
Start: 2020-02-28 | End: 2020-03-30 | Stop reason: DRUGHIGH

## 2020-02-28 ASSESSMENT — ENCOUNTER SYMPTOMS
BLOOD IN STOOL: 0
WHEEZING: 0
NAUSEA: 0
ABDOMINAL PAIN: 0
VOMITING: 0
CONSTIPATION: 0
SHORTNESS OF BREATH: 0
CHEST TIGHTNESS: 0
SINUS PRESSURE: 0
BACK PAIN: 0
TROUBLE SWALLOWING: 0
RHINORRHEA: 0
DIARRHEA: 0
COUGH: 0
ABDOMINAL DISTENTION: 0

## 2020-03-02 ENCOUNTER — TELEPHONE (OUTPATIENT)
Dept: ORTHOPEDIC SURGERY | Age: 25
End: 2020-03-02

## 2020-03-23 PROBLEM — F41.9 ANXIETY: Status: RESOLVED | Noted: 2018-11-15 | Resolved: 2020-03-23

## 2020-03-28 NOTE — PROGRESS NOTES
of breath and wheezing. Cardiovascular: Negative for chest pain, palpitations and leg swelling. Gastrointestinal: Negative for abdominal distention, abdominal pain, blood in stool, constipation, diarrhea, nausea and vomiting. Genitourinary: Negative for dysuria, frequency and hematuria. Musculoskeletal: Negative for arthralgias and back pain. Skin: Negative for rash. Neurological: Negative for dizziness, weakness, light-headedness, numbness and headaches. Psychiatric/Behavioral: Negative for sleep disturbance. The patient is not nervous/anxious. Wt Readings from Last 3 Encounters:   03/30/20 183 lb (83 kg)   02/28/20 189 lb (85.7 kg)   05/15/19 175 lb 6.4 oz (79.6 kg)     BP Readings from Last 3 Encounters:   03/30/20 118/72   02/28/20 110/80   05/15/19 98/68     Pulse Readings from Last 3 Encounters:   03/30/20 96   02/28/20 92   05/15/19 102     /72   Pulse 96   Temp 98.2 °F (36.8 °C)   Ht 5' 7\" (1.702 m)   Wt 183 lb (83 kg)   SpO2 97%   BMI 28.66 kg/m²    Physical Exam  Vitals signs reviewed. Constitutional:       General: He is not in acute distress. Appearance: He is not ill-appearing. HENT:      Head: Normocephalic and atraumatic. Right Ear: External ear normal.      Left Ear: External ear normal.      Nose: Nose normal.   Eyes:      Extraocular Movements: Extraocular movements intact. Conjunctiva/sclera: Conjunctivae normal.   Neck:      Musculoskeletal: Normal range of motion. Cardiovascular:      Rate and Rhythm: Normal rate and regular rhythm. Pulmonary:      Effort: Pulmonary effort is normal. No respiratory distress. Breath sounds: No wheezing, rhonchi or rales. Abdominal:      General: Abdomen is flat. There is no distension. Palpations: Abdomen is soft. Musculoskeletal: Normal range of motion. General: No deformity. Right lower leg: No edema. Left lower leg: No edema.    Skin:     Coloration: Skin is not jaundiced or type  Comments:  I advised him to consume smaller portion sizes. We talked about having a swallow study done and subsequent referral to GI for EGD. Since his issue is overall stable that we can wait to get swallowing study done given the current COVID-19 pandemic. Patient understood and agreed that this is not an urgent issue to be addressed. Chronic fatigue  Comments:  He did not get labs done as ordered on last visit. I advised for him to get it done today. Orders:  -     venlafaxine (EFFEXOR XR) 75 MG extended release capsule; Take 1 capsule by mouth daily    Chronic left-sided low back pain without sciatica  Comments:  I showed patient that rx was sent. Orders:  -     celecoxib (CELEBREX) 200 MG capsule; Take 1 capsule by mouth every 12 hours as needed for Pain       Medications Discontinued During This Encounter   Medication Reason    venlafaxine (EFFEXOR XR) 37.5 MG extended release capsule DOSE ADJUSTMENT    celecoxib (CELEBREX) 200 MG capsule REORDER        Most common adverse effects of medications were discussed     Estimated length of today's visit: 25 minutes      Follow-up: Return in about 4 weeks (around 4/27/2020) for depression; increased venlafaxine. . Patient was informed that if his or her symptoms worsen to return here or go to nearest ER if symptoms significantly worsen. Jeffrey Yeung 177    Electronically signed by Jeri Guerin MD on 3/30/2020 at 3:20 PM.

## 2020-03-30 ENCOUNTER — OFFICE VISIT (OUTPATIENT)
Dept: FAMILY MEDICINE CLINIC | Age: 25
End: 2020-03-30
Payer: COMMERCIAL

## 2020-03-30 VITALS
WEIGHT: 183 LBS | SYSTOLIC BLOOD PRESSURE: 118 MMHG | HEIGHT: 67 IN | OXYGEN SATURATION: 97 % | BODY MASS INDEX: 28.72 KG/M2 | HEART RATE: 96 BPM | TEMPERATURE: 98.2 F | DIASTOLIC BLOOD PRESSURE: 72 MMHG

## 2020-03-30 DIAGNOSIS — R53.82 CHRONIC FATIGUE: ICD-10-CM

## 2020-03-30 LAB
A/G RATIO: 1.7 (ref 1.1–2.2)
ALBUMIN SERPL-MCNC: 4.7 G/DL (ref 3.4–5)
ALP BLD-CCNC: 77 U/L (ref 40–129)
ALT SERPL-CCNC: 32 U/L (ref 10–40)
ANION GAP SERPL CALCULATED.3IONS-SCNC: 13 MMOL/L (ref 3–16)
AST SERPL-CCNC: 19 U/L (ref 15–37)
BASOPHILS ABSOLUTE: 0.1 K/UL (ref 0–0.2)
BASOPHILS RELATIVE PERCENT: 1.1 %
BILIRUB SERPL-MCNC: 0.4 MG/DL (ref 0–1)
BUN BLDV-MCNC: 9 MG/DL (ref 7–20)
C-REACTIVE PROTEIN: 4.4 MG/L (ref 0–5.1)
CALCIUM SERPL-MCNC: 10 MG/DL (ref 8.3–10.6)
CHLORIDE BLD-SCNC: 101 MMOL/L (ref 99–110)
CO2: 27 MMOL/L (ref 21–32)
CREAT SERPL-MCNC: 0.8 MG/DL (ref 0.9–1.3)
EOSINOPHILS ABSOLUTE: 0.8 K/UL (ref 0–0.6)
EOSINOPHILS RELATIVE PERCENT: 9.1 %
FERRITIN: 219.8 NG/ML (ref 30–400)
FOLATE: 14.65 NG/ML (ref 4.78–24.2)
GFR AFRICAN AMERICAN: >60
GFR NON-AFRICAN AMERICAN: >60
GLOBULIN: 2.7 G/DL
GLUCOSE BLD-MCNC: 89 MG/DL (ref 70–99)
HCT VFR BLD CALC: 47 % (ref 40.5–52.5)
HEMOGLOBIN: 15.9 G/DL (ref 13.5–17.5)
LYMPHOCYTES ABSOLUTE: 2.8 K/UL (ref 1–5.1)
LYMPHOCYTES RELATIVE PERCENT: 34.1 %
MAGNESIUM: 2.1 MG/DL (ref 1.8–2.4)
MCH RBC QN AUTO: 28.8 PG (ref 26–34)
MCHC RBC AUTO-ENTMCNC: 33.8 G/DL (ref 31–36)
MCV RBC AUTO: 85.3 FL (ref 80–100)
MONOCYTES ABSOLUTE: 0.7 K/UL (ref 0–1.3)
MONOCYTES RELATIVE PERCENT: 8.4 %
NEUTROPHILS ABSOLUTE: 3.9 K/UL (ref 1.7–7.7)
NEUTROPHILS RELATIVE PERCENT: 47.3 %
PDW BLD-RTO: 13 % (ref 12.4–15.4)
PLATELET # BLD: 283 K/UL (ref 135–450)
PMV BLD AUTO: 8.5 FL (ref 5–10.5)
POTASSIUM SERPL-SCNC: 4.4 MMOL/L (ref 3.5–5.1)
RBC # BLD: 5.51 M/UL (ref 4.2–5.9)
SODIUM BLD-SCNC: 141 MMOL/L (ref 136–145)
T4 FREE: 1.3 NG/DL (ref 0.9–1.8)
TOTAL PROTEIN: 7.4 G/DL (ref 6.4–8.2)
TSH SERPL DL<=0.05 MIU/L-ACNC: 2.36 UIU/ML (ref 0.27–4.2)
VITAMIN B-12: 524 PG/ML (ref 211–911)
VITAMIN D 25-HYDROXY: 8.1 NG/ML
WBC # BLD: 8.3 K/UL (ref 4–11)

## 2020-03-30 PROCEDURE — 99214 OFFICE O/P EST MOD 30 MIN: CPT | Performed by: FAMILY MEDICINE

## 2020-03-30 RX ORDER — VENLAFAXINE HYDROCHLORIDE 75 MG/1
75 CAPSULE, EXTENDED RELEASE ORAL DAILY
Qty: 30 CAPSULE | Refills: 2 | Status: SHIPPED | OUTPATIENT
Start: 2020-03-30 | End: 2020-04-27 | Stop reason: DRUGHIGH

## 2020-03-30 RX ORDER — CELECOXIB 200 MG/1
200 CAPSULE ORAL EVERY 12 HOURS PRN
Qty: 60 CAPSULE | Refills: 2 | Status: SHIPPED | OUTPATIENT
Start: 2020-03-30

## 2020-03-30 ASSESSMENT — ENCOUNTER SYMPTOMS
ABDOMINAL DISTENTION: 0
RHINORRHEA: 0
BACK PAIN: 0
NAUSEA: 0
CHEST TIGHTNESS: 0
TROUBLE SWALLOWING: 0
CONSTIPATION: 0
VOMITING: 0
SHORTNESS OF BREATH: 0
DIARRHEA: 0
COUGH: 0
ABDOMINAL PAIN: 0
WHEEZING: 0
BLOOD IN STOOL: 0
SINUS PRESSURE: 0

## 2020-04-01 PROBLEM — E55.9 VITAMIN D DEFICIENCY: Status: ACTIVE | Noted: 2020-04-01

## 2020-04-01 RX ORDER — CHOLECALCIFEROL (VITAMIN D3) 1250 MCG
50000 CAPSULE ORAL
Qty: 8 CAPSULE | Refills: 0 | Status: SHIPPED | OUTPATIENT
Start: 2020-04-01 | End: 2020-05-27

## 2020-04-25 NOTE — PROGRESS NOTES
distention, abdominal pain, blood in stool, constipation, diarrhea, nausea and vomiting. Genitourinary: Negative for dysuria, frequency and hematuria. Musculoskeletal: Negative for arthralgias and back pain. Skin: Negative for rash. Neurological: Negative for dizziness, weakness, light-headedness, numbness and headaches. Wt Readings from Last 3 Encounters:   04/27/20 170 lb (77.1 kg)   03/30/20 183 lb (83 kg)   02/28/20 189 lb (85.7 kg)     BP Readings from Last 3 Encounters:   03/30/20 118/72   02/28/20 110/80   05/15/19 98/68     Pulse Readings from Last 3 Encounters:   03/30/20 96   02/28/20 92   05/15/19 102     Wt 170 lb (77.1 kg)   BMI 26.63 kg/m²    Physical Exam  Constitutional:       General: He is not in acute distress. Appearance: Normal appearance. He is not ill-appearing. HENT:      Head: Normocephalic and atraumatic. Right Ear: External ear normal.      Left Ear: External ear normal.      Nose: Nose normal.   Eyes:      General: No scleral icterus. Right eye: No discharge. Left eye: No discharge. Extraocular Movements: Extraocular movements intact. Conjunctiva/sclera: Conjunctivae normal.      Pupils: Pupils are equal, round, and reactive to light. Neck:      Musculoskeletal: Normal range of motion. Pulmonary:      Effort: Pulmonary effort is normal. No respiratory distress. Breath sounds: No stridor. No wheezing. Abdominal:      General: There is no distension. Musculoskeletal: Normal range of motion. Skin:     Coloration: Skin is not jaundiced or pale. Findings: No erythema. Neurological:      General: No focal deficit present. Mental Status: He is alert and oriented to person, place, and time. Mental status is at baseline. Psychiatric:         Mood and Affect: Mood normal.         Behavior: Behavior normal.         Thought Content:  Thought content normal.       Assessment/Plan:     Lyndon Raymond was seen today for depression. Diagnoses and all orders for this visit:    Moderate episode of recurrent major depressive disorder ([address]  Comments:  Patient is showing more emotion (smiling) than his previous visits. I informed him that if he's not having satisfactory results that there are several other meds to try. Orders:  -     venlafaxine 150 MG extended release tablet; Take 1 tablet by mouth daily (with breakfast)    Vitamin D deficiency  Comments:  Continue Vitamin D supplements. Will re-check about 3-4 months after last (first one) Vitamin D level. Dysphagia        Comments:               Recommended to get a pill cutter. Medications Discontinued During This Encounter   Medication Reason    venlafaxine (EFFEXOR XR) 75 MG extended release capsule DOSE ADJUSTMENT      Patient was informed that whether starting and/or adding a new medication as well as continuing any current medication(s) that the benefits and risks have to be considered and weighed over. Patient was also informed that there are no medications that no side effects. The most common adverse effects of medications were addressed at today's visit. Estimated length of today's visit: 15 minutes    Follow-up: Return in about 4 weeks (around 5/25/2020) for depression; increased dose of Venlafaxine. . Patient was informed that if his or her symptoms worsen to return here or go to nearest ER if symptoms significantly worsen. Jeffrey Yeung 177    Disclaimer:  Oh Baron is a 21 y.o. male being evaluated by a Virtual Visit (video visit) or telephone (without video) encounter to address concerns as mentioned above. A caregiver was present when appropriate. Due to this being a TeleHealth encounter ([facility]), evaluation of the following organ systems was limited: Vitals/Constitutional/EENT/Resp/CV/GI//MS/Neuro/Skin/Heme-Lymph-Imm.

## 2020-04-27 ENCOUNTER — VIRTUAL VISIT (OUTPATIENT)
Dept: FAMILY MEDICINE CLINIC | Age: 25
End: 2020-04-27
Payer: COMMERCIAL

## 2020-04-27 VITALS — WEIGHT: 170 LBS | BODY MASS INDEX: 26.63 KG/M2

## 2020-04-27 PROCEDURE — 99213 OFFICE O/P EST LOW 20 MIN: CPT | Performed by: FAMILY MEDICINE

## 2020-04-27 RX ORDER — VENLAFAXINE HYDROCHLORIDE 150 MG/1
150 TABLET, EXTENDED RELEASE ORAL
Qty: 30 TABLET | Refills: 1 | Status: SHIPPED | OUTPATIENT
Start: 2020-04-28 | End: 2020-05-22 | Stop reason: ALTCHOICE

## 2020-04-27 ASSESSMENT — ENCOUNTER SYMPTOMS
BACK PAIN: 0
ABDOMINAL DISTENTION: 0
RHINORRHEA: 0
SINUS PRESSURE: 0
CONSTIPATION: 0
DIARRHEA: 0
CHEST TIGHTNESS: 0
VOMITING: 0
NAUSEA: 0
ABDOMINAL PAIN: 0
COUGH: 0
SHORTNESS OF BREATH: 0
WHEEZING: 0
TROUBLE SWALLOWING: 0
BLOOD IN STOOL: 0

## 2020-04-30 ENCOUNTER — TELEPHONE (OUTPATIENT)
Dept: PRIMARY CARE CLINIC | Age: 25
End: 2020-04-30

## 2020-05-19 NOTE — PROGRESS NOTES
Thought Content: Thought content normal.       Assessment/Plan:     Georgette Oneil was seen today for depression. Diagnoses and all orders for this visit:    Moderate episode of recurrent major depressive disorder ([address]  Comments:  I discussed with patient different options: 1) Go back down to Venlafaxine 75 mg 2) Trial of Duloxetine 60 mg. He was amenable to trying a new medication. I told him that there are several anti-depressant medications to choose from and the only we way we can find which one works for him the best is to give each one a try. I think a trial of Fetzima or Pristiq is worth considering. He was also informed that combination therapy may be needed if he has sub-satisfactory response with monotherapy. I've explained to him that drugs of the SSRI/SNRI class can have side effects such as weight gain, sexual dysfunction, insomnia, headache, abdominal discomfort, nausea, vomiting, etc. These medications are generally effective at alleviating symptoms of anxiety and/or depression, but increased anxiety can occur shortly after taking the medication(s) in some patients and should subside over time. Patient was informed to let me know if any significant side effects do occur. Patient was instructed to take the medication every day as directed and that this medication is not an as needed medication because the medication may take at least 2 weeks to see a difference if not at least 4-6 weeks to have a beneficial effect and that by going even 1-2 days without taking the medication one could risk of having rebound anxiety/depression. In addition, the patient was informed that this medication cannot be abruptly stopped after having had taken it for a long period of time and that the medication would have to be gradually tapered off under the guidance of a healthcare provider. Orders:  -     DULoxetine (CYMBALTA) 60 MG extended release capsule;  Take 1 capsule by mouth every morning    Chronic bilateral low

## 2020-05-22 ENCOUNTER — VIRTUAL VISIT (OUTPATIENT)
Dept: FAMILY MEDICINE CLINIC | Age: 25
End: 2020-05-22
Payer: COMMERCIAL

## 2020-05-22 VITALS — BODY MASS INDEX: 27.41 KG/M2 | WEIGHT: 175 LBS

## 2020-05-22 PROCEDURE — 99213 OFFICE O/P EST LOW 20 MIN: CPT | Performed by: FAMILY MEDICINE

## 2020-05-22 RX ORDER — DULOXETIN HYDROCHLORIDE 60 MG/1
60 CAPSULE, DELAYED RELEASE ORAL EVERY MORNING
Qty: 30 CAPSULE | Refills: 0 | Status: SHIPPED | OUTPATIENT
Start: 2020-05-23

## 2020-05-22 ASSESSMENT — ENCOUNTER SYMPTOMS
CONSTIPATION: 0
ABDOMINAL DISTENTION: 0
TROUBLE SWALLOWING: 0
ABDOMINAL PAIN: 0
COUGH: 0
WHEEZING: 0
SHORTNESS OF BREATH: 0
SINUS PRESSURE: 0
CHEST TIGHTNESS: 0
BLOOD IN STOOL: 0
DIARRHEA: 0
NAUSEA: 0
BACK PAIN: 0
VOMITING: 0
RHINORRHEA: 0

## 2020-10-27 ENCOUNTER — TELEPHONE (OUTPATIENT)
Dept: ORTHOPEDIC SURGERY | Age: 25
End: 2020-10-27

## 2020-10-27 NOTE — TELEPHONE ENCOUNTER
GAVE A NO RECORD FOR DATE REQUESTED STATEMENT 07/14/2019 TO 01/01/2020 ( [address] ) TO ANDRES FOUNTAIN @ [phone]